# Patient Record
Sex: FEMALE | Race: WHITE | HISPANIC OR LATINO | Employment: UNEMPLOYED | URBAN - METROPOLITAN AREA
[De-identification: names, ages, dates, MRNs, and addresses within clinical notes are randomized per-mention and may not be internally consistent; named-entity substitution may affect disease eponyms.]

---

## 2022-12-18 ENCOUNTER — HOSPITAL ENCOUNTER (EMERGENCY)
Facility: HOSPITAL | Age: 57
Discharge: HOME | End: 2022-12-18
Attending: EMERGENCY MEDICINE

## 2022-12-18 VITALS
BODY MASS INDEX: 29.79 KG/M2 | SYSTOLIC BLOOD PRESSURE: 134 MMHG | HEART RATE: 70 BPM | TEMPERATURE: 98 F | HEIGHT: 65 IN | OXYGEN SATURATION: 100 % | RESPIRATION RATE: 17 BRPM | DIASTOLIC BLOOD PRESSURE: 86 MMHG

## 2022-12-18 DIAGNOSIS — R07.89 CHEST DISCOMFORT: ICD-10-CM

## 2022-12-18 DIAGNOSIS — M54.2 NECK PAIN: ICD-10-CM

## 2022-12-18 DIAGNOSIS — M19.011 OSTEOARTHRITIS OF RIGHT SHOULDER, UNSPECIFIED OSTEOARTHRITIS TYPE: Primary | ICD-10-CM

## 2022-12-18 DIAGNOSIS — M47.812 OSTEOARTHRITIS OF CERVICAL SPINE, UNSPECIFIED SPINAL OSTEOARTHRITIS COMPLICATION STATUS: ICD-10-CM

## 2022-12-18 DIAGNOSIS — R07.9 CHEST PAIN: ICD-10-CM

## 2022-12-18 DIAGNOSIS — M25.511 RIGHT SHOULDER PAIN: ICD-10-CM

## 2022-12-18 LAB
ALBUMIN SERPL BCP-MCNC: 3.9 G/DL (ref 3.5–5.2)
ALP SERPL-CCNC: 118 U/L (ref 55–135)
ALT SERPL W/O P-5'-P-CCNC: 26 U/L (ref 10–44)
ANION GAP SERPL CALC-SCNC: 7 MMOL/L (ref 8–16)
AST SERPL-CCNC: 20 U/L (ref 10–40)
BASOPHILS # BLD AUTO: 0.03 K/UL (ref 0–0.2)
BASOPHILS NFR BLD: 0.7 % (ref 0–1.9)
BILIRUB SERPL-MCNC: 0.4 MG/DL (ref 0.1–1)
BNP SERPL-MCNC: 40 PG/ML (ref 0–99)
BUN SERPL-MCNC: 14 MG/DL (ref 6–20)
CALCIUM SERPL-MCNC: 9.3 MG/DL (ref 8.7–10.5)
CHLORIDE SERPL-SCNC: 109 MMOL/L (ref 95–110)
CO2 SERPL-SCNC: 25 MMOL/L (ref 23–29)
CREAT SERPL-MCNC: 0.7 MG/DL (ref 0.5–1.4)
D DIMER PPP IA.FEU-MCNC: 0.28 MG/L FEU
DIFFERENTIAL METHOD: ABNORMAL
EOSINOPHIL # BLD AUTO: 0.2 K/UL (ref 0–0.5)
EOSINOPHIL NFR BLD: 3.7 % (ref 0–8)
ERYTHROCYTE [DISTWIDTH] IN BLOOD BY AUTOMATED COUNT: 12.4 % (ref 11.5–14.5)
EST. GFR  (NO RACE VARIABLE): >60 ML/MIN/1.73 M^2
GLUCOSE SERPL-MCNC: 101 MG/DL (ref 70–110)
HCT VFR BLD AUTO: 38.3 % (ref 37–48.5)
HGB BLD-MCNC: 13 G/DL (ref 12–16)
IMM GRANULOCYTES # BLD AUTO: 0 K/UL (ref 0–0.04)
IMM GRANULOCYTES NFR BLD AUTO: 0 % (ref 0–0.5)
LYMPHOCYTES # BLD AUTO: 1.7 K/UL (ref 1–4.8)
LYMPHOCYTES NFR BLD: 38.7 % (ref 18–48)
MCH RBC QN AUTO: 28.5 PG (ref 27–31)
MCHC RBC AUTO-ENTMCNC: 33.9 G/DL (ref 32–36)
MCV RBC AUTO: 84 FL (ref 82–98)
MONOCYTES # BLD AUTO: 0.4 K/UL (ref 0.3–1)
MONOCYTES NFR BLD: 8.2 % (ref 4–15)
NEUTROPHILS # BLD AUTO: 2.1 K/UL (ref 1.8–7.7)
NEUTROPHILS NFR BLD: 48.7 % (ref 38–73)
NRBC BLD-RTO: 0 /100 WBC
PLATELET # BLD AUTO: 299 K/UL (ref 150–450)
PMV BLD AUTO: 8.6 FL (ref 9.2–12.9)
POTASSIUM SERPL-SCNC: 3.9 MMOL/L (ref 3.5–5.1)
PROT SERPL-MCNC: 7.4 G/DL (ref 6–8.4)
RBC # BLD AUTO: 4.56 M/UL (ref 4–5.4)
SODIUM SERPL-SCNC: 141 MMOL/L (ref 136–145)
TROPONIN I SERPL DL<=0.01 NG/ML-MCNC: <0.006 NG/ML (ref 0–0.03)
WBC # BLD AUTO: 4.37 K/UL (ref 3.9–12.7)

## 2022-12-18 PROCEDURE — 85025 COMPLETE CBC W/AUTO DIFF WBC: CPT | Performed by: EMERGENCY MEDICINE

## 2022-12-18 PROCEDURE — 85379 FIBRIN DEGRADATION QUANT: CPT | Performed by: EMERGENCY MEDICINE

## 2022-12-18 PROCEDURE — 63600175 PHARM REV CODE 636 W HCPCS: Performed by: EMERGENCY MEDICINE

## 2022-12-18 PROCEDURE — 83880 ASSAY OF NATRIURETIC PEPTIDE: CPT | Performed by: EMERGENCY MEDICINE

## 2022-12-18 PROCEDURE — 84484 ASSAY OF TROPONIN QUANT: CPT | Performed by: EMERGENCY MEDICINE

## 2022-12-18 PROCEDURE — 93010 EKG 12-LEAD: ICD-10-PCS | Mod: ,,, | Performed by: INTERNAL MEDICINE

## 2022-12-18 PROCEDURE — 96374 THER/PROPH/DIAG INJ IV PUSH: CPT

## 2022-12-18 PROCEDURE — 99285 EMERGENCY DEPT VISIT HI MDM: CPT | Mod: 25

## 2022-12-18 PROCEDURE — 93010 ELECTROCARDIOGRAM REPORT: CPT | Mod: ,,, | Performed by: INTERNAL MEDICINE

## 2022-12-18 PROCEDURE — 80053 COMPREHEN METABOLIC PANEL: CPT | Performed by: EMERGENCY MEDICINE

## 2022-12-18 PROCEDURE — 93005 ELECTROCARDIOGRAM TRACING: CPT

## 2022-12-18 PROCEDURE — 25000003 PHARM REV CODE 250: Performed by: EMERGENCY MEDICINE

## 2022-12-18 RX ORDER — CYCLOBENZAPRINE HCL 10 MG
10 TABLET ORAL
Status: COMPLETED | OUTPATIENT
Start: 2022-12-18 | End: 2022-12-18

## 2022-12-18 RX ORDER — FAMOTIDINE 20 MG/1
20 TABLET, FILM COATED ORAL
Status: COMPLETED | OUTPATIENT
Start: 2022-12-18 | End: 2022-12-18

## 2022-12-18 RX ORDER — FAMOTIDINE 20 MG/1
20 TABLET, FILM COATED ORAL 2 TIMES DAILY
Qty: 30 TABLET | Refills: 0 | Status: SHIPPED | OUTPATIENT
Start: 2022-12-18 | End: 2023-04-06

## 2022-12-18 RX ORDER — ASPIRIN 325 MG
325 TABLET ORAL
Status: COMPLETED | OUTPATIENT
Start: 2022-12-18 | End: 2022-12-18

## 2022-12-18 RX ORDER — KETOROLAC TROMETHAMINE 30 MG/ML
15 INJECTION, SOLUTION INTRAMUSCULAR; INTRAVENOUS
Status: COMPLETED | OUTPATIENT
Start: 2022-12-18 | End: 2022-12-18

## 2022-12-18 RX ORDER — CYCLOBENZAPRINE HCL 10 MG
10 TABLET ORAL 3 TIMES DAILY PRN
Qty: 15 TABLET | Refills: 0 | Status: SHIPPED | OUTPATIENT
Start: 2022-12-18 | End: 2023-04-06 | Stop reason: ALTCHOICE

## 2022-12-18 RX ORDER — NAPROXEN 500 MG/1
500 TABLET ORAL 2 TIMES DAILY PRN
Qty: 30 TABLET | Refills: 0 | Status: SHIPPED | OUTPATIENT
Start: 2022-12-18 | End: 2023-04-06 | Stop reason: ALTCHOICE

## 2022-12-18 RX ADMIN — ASPIRIN 325 MG ORAL TABLET 325 MG: 325 PILL ORAL at 09:12

## 2022-12-18 RX ADMIN — KETOROLAC TROMETHAMINE 15 MG: 30 INJECTION, SOLUTION INTRAMUSCULAR; INTRAVENOUS at 09:12

## 2022-12-18 RX ADMIN — FAMOTIDINE 20 MG: 20 TABLET ORAL at 09:12

## 2022-12-18 RX ADMIN — CYCLOBENZAPRINE 10 MG: 10 TABLET, FILM COATED ORAL at 09:12

## 2022-12-18 NOTE — ED PROVIDER NOTES
"Encounter Date: 12/18/2022    SCRIBE #1 NOTE: I, MAXIME MALDONADO, am scribing for, and in the presence of,  Charley Niño MD. I have scribed the following portions of the note - Other sections scribed: HPI, ROS, PE, EKG.     History     Chief Complaint   Patient presents with    Shoulder Pain     Pt c/o right shoulder/arm pain x 3 days accompanied by chest discomfort x 1 week which is reproducible when pt presses on affected area . Pt denies fall or trauma to extremity. Pt states she took ibuprofen for her pain last night with no relief. Pt denies cp, sob, n/v/d.     This is a 56 y/o female with a PMHx of HTN who presents to the ED complaining of right shoulder pain with associated left-sided CP, palpitations, and SOB for one week. The patient states that the right shoulder pain has been constant and worsens with movement. She further states that the CP worsens at night and feels like a "pressure". Denies any recent traumas or injuries. Endorses taking Ibuprofen in attempt to alleviate pain with minimal relief. Further endorses compliance with antihypertensives. No other exacerbating or alleviating factors. Denies any fever, nausea, emesis, diaphoresis, leg swelling, or other associated symptoms.     The history is provided by the patient. The history is limited by a language barrier. No  was used.   Review of patient's allergies indicates:  No Known Allergies  Past Medical History:   Diagnosis Date    Hypertension      History reviewed. No pertinent surgical history.  History reviewed. No pertinent family history.  Social History     Tobacco Use    Smoking status: Never   Substance Use Topics    Alcohol use: No     Review of Systems   Constitutional:  Negative for diaphoresis and fever.   HENT:  Negative for facial swelling and voice change.    Eyes:  Negative for pain.   Respiratory:  Positive for shortness of breath. Negative for choking.    Cardiovascular:  Positive for chest pain and " palpitations. Negative for leg swelling.   Gastrointestinal:  Negative for abdominal pain, nausea and vomiting.   Genitourinary:  Negative for dysuria and frequency.   Musculoskeletal:  Positive for arthralgias. Negative for back pain.   Skin:  Negative for rash.   Neurological:  Negative for weakness and numbness.   Psychiatric/Behavioral:  Negative for self-injury.      Physical Exam     Initial Vitals [12/18/22 0903]   BP Pulse Resp Temp SpO2   136/63 87 16 98.1 °F (36.7 °C) 98 %      MAP       --         Physical Exam    Nursing note and vitals reviewed.  Constitutional: She is not diaphoretic. No distress.   HENT:   Head: Normocephalic and atraumatic.   Mouth/Throat: Oropharynx is clear and moist.   Eyes: Conjunctivae and EOM are normal. No scleral icterus.   Neck: Neck supple. No JVD present.   Normal range of motion.  Cardiovascular:  Normal rate, regular rhythm and intact distal pulses.           Pulmonary/Chest: Breath sounds normal. No stridor. No respiratory distress.   Abdominal: Abdomen is soft. Bowel sounds are normal. She exhibits no distension. There is no abdominal tenderness.   Musculoskeletal:         General: No edema. Normal range of motion.      Right shoulder: Tenderness present.      Cervical back: Normal range of motion and neck supple.      Comments: Right shoulder pain with attempted ROM. 5/5 strength.      Neurological: She is alert. She has normal strength. No cranial nerve deficit or sensory deficit. GCS score is 15. GCS eye subscore is 4. GCS verbal subscore is 5. GCS motor subscore is 6.   Skin: Skin is warm and dry.   Psychiatric: She has a normal mood and affect.       ED Course   Procedures  Labs Reviewed   CBC W/ AUTO DIFFERENTIAL - Abnormal; Notable for the following components:       Result Value    MPV 8.6 (*)     All other components within normal limits   COMPREHENSIVE METABOLIC PANEL - Abnormal; Notable for the following components:    Anion Gap 7 (*)     All other  components within normal limits   TROPONIN I   B-TYPE NATRIURETIC PEPTIDE   D DIMER, QUANTITATIVE     EKG Readings: (Independently Interpreted)   Rhythm: Normal Sinus Rhythm. Heart Rate: 78 bpm. ST Segments: Non-Specific ST Segment Depression. T Waves Flipped: V4, II, III, AVF, V5 and V6. Axis: Normal.     Imaging Results              X-Ray Chest AP Portable (Final result)  Result time 12/18/22 10:38:55      Final result by Bony Arcos MD (12/18/22 10:38:55)                   Impression:      No acute chest disease identified.  No detrimental change noted when compared to 04/16/2016.      Electronically signed by: Bony Arcos MD  Date:    12/18/2022  Time:    10:38               Narrative:    EXAMINATION:  XR CHEST AP PORTABLE    CLINICAL HISTORY:  Chest Pain;    TECHNIQUE:  Single frontal view of the chest was performed.    COMPARISON:  04/16/2016.    FINDINGS:  The heart and mediastinal structures are unremarkable.  Pulmonary vasculature is within normal limits.  The lungs are well aerated and free of focal consolidations.  There is no evidence for pneumothorax or large pleural effusions.  Bony structures are grossly intact.                                       X-Ray Cervical Spine AP And Lateral (Final result)  Result time 12/18/22 10:40:07      Final result by Bony Arcos MD (12/18/22 10:40:07)                   Impression:      No evidence for acute fracture, bone destruction, or subluxation.    Cervical spondylosis.      Electronically signed by: Bony Arcos MD  Date:    12/18/2022  Time:    10:40               Narrative:    EXAMINATION:  XR CERVICAL SPINE AP LATERAL    CLINICAL HISTORY:  Cervicalgia    TECHNIQUE:  AP, lateral and open mouth views of the cervical spine were performed.    COMPARISON:  None.    FINDINGS:  Posterior vertebral alignment is satisfactory.  Vertebral body heights are well maintained.  The odontoid appears intact there is no evidence for acute fracture or bone  destruction.  There are degenerative changes with small anterior osteophytes noted most prominently within the mid to lower cervical spine.  Prevertebral soft tissues are unremarkable.                                       X-Ray Shoulder Complete 2 View Right (Final result)  Result time 12/18/22 10:41:01      Final result by Bony Arcos MD (12/18/22 10:41:01)                   Impression:      No evidence for acute fracture, bone destruction, or dislocation.    Degenerative changes of the right glenohumeral and acromioclavicular joints.      Electronically signed by: Bony Arcos MD  Date:    12/18/2022  Time:    10:41               Narrative:    EXAMINATION:  XR SHOULDER COMPLETE 2 OR MORE VIEWS RIGHT    CLINICAL HISTORY:  Pain in right shoulder    TECHNIQUE:  Two or three views of the right shoulder were performed.    COMPARISON:  None    FINDINGS:  The bones are intact.  There is no evidence for acute fracture or bone destruction.  There is no evidence for dislocation.  There are degenerative changes involving the glenohumeral and acromioclavicular joints with small osteophytes present.  No bony erosions are identified.  Soft tissues are unremarkable.                                       Medications   aspirin tablet 325 mg (325 mg Oral Given 12/18/22 0951)   ketorolac injection 15 mg (15 mg Intravenous Given 12/18/22 0951)   cyclobenzaprine tablet 10 mg (10 mg Oral Given 12/18/22 0951)   famotidine tablet 20 mg (20 mg Oral Given 12/18/22 0951)     Medical Decision Making:   History:   Old Medical Records: I decided to obtain old medical records.  Independently Interpreted Test(s):   I have ordered and independently interpreted EKG Reading(s) - see prior notes  Clinical Tests:   Lab Tests: Ordered and Reviewed  Radiological Study: Ordered and Reviewed  Medical Tests: Ordered and Reviewed  ED Management:  Patient presenting secondary to chest pain. EKG was reassuring and chest xray showed nothing acute.   Shoulder x-ray and neck x-ray with findings consistent with degenerative changes.  Patient given anti-inflammatory and muscle relaxant in the emergency department.  On reassessment she reports improvement in symptoms.  She is able to range the shoulder better.  Symptoms appear most consistent with musculoskeletal pain.  Patient discharge on anti-inflammatory and muscle relaxant.  Referred to primary physician, Cardiology, PM&R      [email or web address]    Also considered but less likely:     PE: normal rate, dimer negative.   Pneumonia: chest xray negative. No fever. No cough and lungs non consistent with pna  Tamponade: unlikely due to chest xray and ekg  STEMI: No STEMI on ekg  Dissection: equal pulses bilaterally and no ripping chest pain to the back  Esophageal rupture: no dysphagia or vomiting and chest xray negative for mediastinal air  Arrhythmia: no arrhythmia on ekg  CHF: no fluid overload on Cxr and physical exam  Pneumothorax: bilateral breath sounds and no signs of pneumothorax on chest xray     Patient has a lower risk of impending cardiac failure to the heart score of 3. Patient was discharged with follow up with PMD. Return precautions given, patient understands and agrees with plan. All questions answered.      This chart was completed using dictation software, as a result there may be some transcription errors.    Additional MDM:   Heart Score:    History:          Slightly suspicious.  ECG:             Nonspecific repolarisation disturbance  Age:               45-65 years  Risk factors: 1-2 risk factors  Troponin:       Less than or equal to normal limit  Final Score: 3       Scribe Attestation:   Scribe #1: I performed the above scribed service and the documentation accurately describes the services I performed. I attest to the accuracy of the note.                   Clinical Impression:   Final diagnoses:  [R07.9] Chest pain  [M54.2] Neck pain  [M25.511] Right  shoulder pain  [M19.011] Osteoarthritis of right shoulder, unspecified osteoarthritis type (Primary)  [M47.812] Osteoarthritis of cervical spine, unspecified spinal osteoarthritis complication status     Scribe attestation: I, Charley Niño , personally performed the services described in this documentation. All medical record entries made by the scribe were at my direction and in my presence.  I have reviewed the chart and agree that the record reflects my personal performance and is accurate and complete.    ED Disposition Condition    Discharge Stable          ED Prescriptions       Medication Sig Dispense Start Date End Date Auth. Provider    famotidine (PEPCID) 20 MG tablet Take 1 tablet (20 mg total) by mouth 2 (two) times daily. 30 tablet 12/18/2022 -- Charley Niño MD    naproxen (NAPROSYN) 500 MG tablet Take 1 tablet (500 mg total) by mouth 2 (two) times daily as needed (Pain). Take with food to prevent upset stomach 30 tablet 12/18/2022 -- Charley Niño MD    cyclobenzaprine (FLEXERIL) 10 MG tablet Take 1 tablet (10 mg total) by mouth 3 (three) times daily as needed for Muscle spasms (Or tightness). Do not drive or operate heavy machinery on this medication as it can cause you to become drowsy and decrease coordination. 15 tablet 12/18/2022 -- Charley Niño MD          Follow-up Information       Follow up With Specialties Details Why Contact Info Additional Information     [facility]  Schedule an appointment as soon as possible for a visit   [address] [place] [address]  [phone]       [facility] Cardiology Schedule an appointment as soon as possible for a visit   [address]  [facility] [id number] [phone] Please park in garage or Medical Office Bldg. surface lot and use Medical Ofc Bldg elevator. Check in at [address].             Charley Niño MD  12/18/22 9467

## 2022-12-18 NOTE — DISCHARGE INSTRUCTIONS
Thank you for coming to our Emergency Department today. It is important to remember that some problems are difficult to diagnose and may not be found during your first visit. Be sure to follow up with your primary care doctor and review any labs/imaging that was performed with them. If you do not have a primary care doctor, you may contact the one listed on your discharge paperwork or you may also call the [facility] Appointment Desk at [phone] to schedule an appointment with one.     All medications may potentially have side effects and it is impossible to predict which medications may give you side effects. If you feel that you are having a negative effect of any medication you should immediately stop taking them and seek medical attention.    Return to the ER with any questions/concerns, new/concerning symptoms, worsening or failure to improve. Do not drive or make any important decisions for 24 hours if you have received any pain medications, sedatives or mood altering drugs during your ER visit.

## 2023-04-05 ENCOUNTER — HOSPITAL ENCOUNTER (OUTPATIENT)
Facility: HOSPITAL | Age: 58
Discharge: HOME | End: 2023-04-06
Attending: EMERGENCY MEDICINE | Admitting: INTERNAL MEDICINE

## 2023-04-05 DIAGNOSIS — R07.9 CHEST PAIN: Primary | ICD-10-CM

## 2023-04-05 LAB
ALBUMIN SERPL BCP-MCNC: 3.9 G/DL (ref 3.5–5.2)
ALP SERPL-CCNC: 117 U/L (ref 55–135)
ALT SERPL W/O P-5'-P-CCNC: 22 U/L (ref 10–44)
ANION GAP SERPL CALC-SCNC: 11 MMOL/L (ref 8–16)
AST SERPL-CCNC: 20 U/L (ref 10–40)
BASOPHILS # BLD AUTO: 0.05 K/UL (ref 0–0.2)
BASOPHILS NFR BLD: 1.1 % (ref 0–1.9)
BILIRUB SERPL-MCNC: 0.2 MG/DL (ref 0.1–1)
BNP SERPL-MCNC: 21 PG/ML (ref 0–99)
BUN SERPL-MCNC: 18 MG/DL (ref 6–20)
CALCIUM SERPL-MCNC: 9.2 MG/DL (ref 8.7–10.5)
CHLORIDE SERPL-SCNC: 106 MMOL/L (ref 95–110)
CO2 SERPL-SCNC: 24 MMOL/L (ref 23–29)
CREAT SERPL-MCNC: 0.8 MG/DL (ref 0.5–1.4)
DIFFERENTIAL METHOD: ABNORMAL
EOSINOPHIL # BLD AUTO: 0.2 K/UL (ref 0–0.5)
EOSINOPHIL NFR BLD: 4.7 % (ref 0–8)
ERYTHROCYTE [DISTWIDTH] IN BLOOD BY AUTOMATED COUNT: 12.3 % (ref 11.5–14.5)
EST. GFR  (NO RACE VARIABLE): >60 ML/MIN/1.73 M^2
GLUCOSE SERPL-MCNC: 115 MG/DL (ref 70–110)
HCT VFR BLD AUTO: 37.2 % (ref 37–48.5)
HGB BLD-MCNC: 13.1 G/DL (ref 12–16)
IMM GRANULOCYTES # BLD AUTO: 0.01 K/UL (ref 0–0.04)
IMM GRANULOCYTES NFR BLD AUTO: 0.2 % (ref 0–0.5)
LYMPHOCYTES # BLD AUTO: 2 K/UL (ref 1–4.8)
LYMPHOCYTES NFR BLD: 43.1 % (ref 18–48)
MCH RBC QN AUTO: 29.9 PG (ref 27–31)
MCHC RBC AUTO-ENTMCNC: 35.2 G/DL (ref 32–36)
MCV RBC AUTO: 85 FL (ref 82–98)
MONOCYTES # BLD AUTO: 0.3 K/UL (ref 0.3–1)
MONOCYTES NFR BLD: 6.4 % (ref 4–15)
NEUTROPHILS # BLD AUTO: 2.1 K/UL (ref 1.8–7.7)
NEUTROPHILS NFR BLD: 44.5 % (ref 38–73)
NRBC BLD-RTO: 0 /100 WBC
PLATELET # BLD AUTO: 321 K/UL (ref 150–450)
PMV BLD AUTO: 8.9 FL (ref 9.2–12.9)
POTASSIUM SERPL-SCNC: 3.7 MMOL/L (ref 3.5–5.1)
PROT SERPL-MCNC: 7.4 G/DL (ref 6–8.4)
RBC # BLD AUTO: 4.38 M/UL (ref 4–5.4)
SODIUM SERPL-SCNC: 141 MMOL/L (ref 136–145)
TROPONIN I SERPL DL<=0.01 NG/ML-MCNC: 0.01 NG/ML (ref 0–0.03)
TROPONIN I SERPL DL<=0.01 NG/ML-MCNC: <0.006 NG/ML (ref 0–0.03)
WBC # BLD AUTO: 4.66 K/UL (ref 3.9–12.7)

## 2023-04-05 PROCEDURE — 93005 ELECTROCARDIOGRAM TRACING: CPT

## 2023-04-05 PROCEDURE — 93010 ELECTROCARDIOGRAM REPORT: CPT | Mod: ,,, | Performed by: INTERNAL MEDICINE

## 2023-04-05 PROCEDURE — 25000003 PHARM REV CODE 250: Performed by: EMERGENCY MEDICINE

## 2023-04-05 PROCEDURE — 99285 EMERGENCY DEPT VISIT HI MDM: CPT | Mod: 25

## 2023-04-05 PROCEDURE — 84484 ASSAY OF TROPONIN QUANT: CPT | Mod: 91 | Performed by: EMERGENCY MEDICINE

## 2023-04-05 PROCEDURE — 93010 EKG 12-LEAD: ICD-10-PCS | Mod: ,,, | Performed by: INTERNAL MEDICINE

## 2023-04-05 PROCEDURE — 87502 INFLUENZA DNA AMP PROBE: CPT

## 2023-04-05 PROCEDURE — 80053 COMPREHEN METABOLIC PANEL: CPT | Performed by: EMERGENCY MEDICINE

## 2023-04-05 PROCEDURE — 83880 ASSAY OF NATRIURETIC PEPTIDE: CPT | Performed by: EMERGENCY MEDICINE

## 2023-04-05 PROCEDURE — 85025 COMPLETE CBC W/AUTO DIFF WBC: CPT | Performed by: EMERGENCY MEDICINE

## 2023-04-05 RX ORDER — SODIUM CHLORIDE 0.9 % (FLUSH) 0.9 %
10 SYRINGE (ML) INJECTION EVERY 12 HOURS PRN
Status: DISCONTINUED | OUTPATIENT
Start: 2023-04-06 | End: 2023-04-06 | Stop reason: HOSPADM

## 2023-04-05 RX ORDER — ENOXAPARIN SODIUM 100 MG/ML
40 INJECTION SUBCUTANEOUS EVERY 24 HOURS
Status: DISCONTINUED | OUTPATIENT
Start: 2023-04-06 | End: 2023-04-06 | Stop reason: HOSPADM

## 2023-04-05 RX ORDER — GLUCAGON 1 MG
1 KIT INJECTION
Status: DISCONTINUED | OUTPATIENT
Start: 2023-04-06 | End: 2023-04-06 | Stop reason: HOSPADM

## 2023-04-05 RX ORDER — LIDOCAINE HYDROCHLORIDE 20 MG/ML
15 SOLUTION OROPHARYNGEAL ONCE
Status: COMPLETED | OUTPATIENT
Start: 2023-04-05 | End: 2023-04-05

## 2023-04-05 RX ORDER — DEXTROSE 40 %
15 GEL (GRAM) ORAL
Status: DISCONTINUED | OUTPATIENT
Start: 2023-04-05 | End: 2023-04-06 | Stop reason: HOSPADM

## 2023-04-05 RX ORDER — TALC
9 POWDER (GRAM) TOPICAL NIGHTLY
Status: DISCONTINUED | OUTPATIENT
Start: 2023-04-06 | End: 2023-04-06 | Stop reason: HOSPADM

## 2023-04-05 RX ORDER — MAG HYDROX/ALUMINUM HYD/SIMETH 200-200-20
30 SUSPENSION, ORAL (FINAL DOSE FORM) ORAL
Status: DISCONTINUED | OUTPATIENT
Start: 2023-04-06 | End: 2023-04-06 | Stop reason: HOSPADM

## 2023-04-05 RX ORDER — MAG HYDROX/ALUMINUM HYD/SIMETH 200-200-20
30 SUSPENSION, ORAL (FINAL DOSE FORM) ORAL ONCE
Status: COMPLETED | OUTPATIENT
Start: 2023-04-05 | End: 2023-04-05

## 2023-04-05 RX ORDER — NALOXONE HCL 0.4 MG/ML
0.02 VIAL (ML) INJECTION
Status: DISCONTINUED | OUTPATIENT
Start: 2023-04-05 | End: 2023-04-06 | Stop reason: HOSPADM

## 2023-04-05 RX ORDER — SUCRALFATE 1 G/10ML
1 SUSPENSION ORAL EVERY 6 HOURS
Status: DISCONTINUED | OUTPATIENT
Start: 2023-04-06 | End: 2023-04-06 | Stop reason: HOSPADM

## 2023-04-05 RX ORDER — DEXTROSE 40 %
30 GEL (GRAM) ORAL
Status: DISCONTINUED | OUTPATIENT
Start: 2023-04-05 | End: 2023-04-06 | Stop reason: HOSPADM

## 2023-04-05 RX ADMIN — ALUMINUM HYDROXIDE, MAGNESIUM HYDROXIDE, AND SIMETHICONE 30 ML: 200; 200; 20 SUSPENSION ORAL at 09:04

## 2023-04-05 RX ADMIN — LIDOCAINE HYDROCHLORIDE 15 ML: 20 SOLUTION ORAL; TOPICAL at 09:04

## 2023-04-06 VITALS
HEART RATE: 71 BPM | HEIGHT: 65 IN | DIASTOLIC BLOOD PRESSURE: 72 MMHG | SYSTOLIC BLOOD PRESSURE: 105 MMHG | BODY MASS INDEX: 29.16 KG/M2 | RESPIRATION RATE: 18 BRPM | TEMPERATURE: 98 F | WEIGHT: 175 LBS | OXYGEN SATURATION: 97 %

## 2023-04-06 LAB
ALBUMIN SERPL BCP-MCNC: 3.9 G/DL (ref 3.5–5.2)
ALP SERPL-CCNC: 115 U/L (ref 55–135)
ALT SERPL W/O P-5'-P-CCNC: 19 U/L (ref 10–44)
ANION GAP SERPL CALC-SCNC: 9 MMOL/L (ref 8–16)
AORTIC ROOT ANNULUS: 2.94 CM
AORTIC VALVE CUSP SEPERATION: 1.56 CM
AST SERPL-CCNC: 19 U/L (ref 10–40)
AV INDEX (PROSTH): 0.77
AV MEAN GRADIENT: 4 MMHG
AV PEAK GRADIENT: 7 MMHG
AV REGURGITATION PRESSURE HALF TIME: 779.05 MS
AV VALVE AREA: 2.5 CM2
AV VELOCITY RATIO: 0.68
BASOPHILS # BLD AUTO: 0.05 K/UL (ref 0–0.2)
BASOPHILS NFR BLD: 1 % (ref 0–1.9)
BILIRUB SERPL-MCNC: 0.3 MG/DL (ref 0.1–1)
BSA FOR ECHO PROCEDURE: 1.91 M2
BUN SERPL-MCNC: 15 MG/DL (ref 6–20)
CALCIUM SERPL-MCNC: 8.9 MG/DL (ref 8.7–10.5)
CHLORIDE SERPL-SCNC: 103 MMOL/L (ref 95–110)
CHOLEST SERPL-MCNC: 203 MG/DL (ref 120–199)
CHOLEST/HDLC SERPL: 3.9 {RATIO} (ref 2–5)
CO2 SERPL-SCNC: 30 MMOL/L (ref 23–29)
CREAT SERPL-MCNC: 0.7 MG/DL (ref 0.5–1.4)
CTP QC/QA: YES
CTP QC/QA: YES
CV ECHO LV RWT: 0.39 CM
DIFFERENTIAL METHOD: ABNORMAL
DOP CALC AO PEAK VEL: 1.29 M/S
DOP CALC AO VTI: 23.3 CM
DOP CALC LVOT AREA: 3.2 CM2
DOP CALC LVOT DIAMETER: 2.03 CM
DOP CALC LVOT PEAK VEL: 0.88 M/S
DOP CALC LVOT STROKE VOLUME: 58.23 CM3
DOP CALC MV VTI: 29.4 CM
DOP CALCLVOT PEAK VEL VTI: 18 CM
E WAVE DECELERATION TIME: 226.45 MSEC
E/A RATIO: 0.73
E/E' RATIO: 8.35 M/S
ECHO LV POSTERIOR WALL: 0.92 CM (ref 0.6–1.1)
EJECTION FRACTION: 55 %
EOSINOPHIL # BLD AUTO: 0.2 K/UL (ref 0–0.5)
EOSINOPHIL NFR BLD: 4.3 % (ref 0–8)
ERYTHROCYTE [DISTWIDTH] IN BLOOD BY AUTOMATED COUNT: 12.6 % (ref 11.5–14.5)
EST. GFR  (NO RACE VARIABLE): >60 ML/MIN/1.73 M^2
ESTIMATED AVG GLUCOSE: 100 MG/DL (ref 68–131)
FRACTIONAL SHORTENING: 45 % (ref 28–44)
GLUCOSE SERPL-MCNC: 101 MG/DL (ref 70–110)
HAV IGM SERPL QL IA: NORMAL
HBA1C MFR BLD: 5.1 % (ref 4–5.6)
HBV CORE IGM SERPL QL IA: NORMAL
HBV SURFACE AG SERPL QL IA: NORMAL
HCT VFR BLD AUTO: 39.1 % (ref 37–48.5)
HCV AB SERPL QL IA: NORMAL
HDLC SERPL-MCNC: 52 MG/DL (ref 40–75)
HDLC SERPL: 25.6 % (ref 20–50)
HGB BLD-MCNC: 13.5 G/DL (ref 12–16)
HIV 1+2 AB+HIV1 P24 AG SERPL QL IA: NORMAL
IMM GRANULOCYTES # BLD AUTO: 0.01 K/UL (ref 0–0.04)
IMM GRANULOCYTES NFR BLD AUTO: 0.2 % (ref 0–0.5)
INTERVENTRICULAR SEPTUM: 0.75 CM (ref 0.6–1.1)
IVRT: 102.76 MSEC
LA MAJOR: 5.38 CM
LA MINOR: 4.92 CM
LA WIDTH: 3.3 CM
LDLC SERPL CALC-MCNC: 134.6 MG/DL (ref 63–159)
LEFT ATRIUM SIZE: 3.73 CM
LEFT ATRIUM VOLUME INDEX MOD: 16.6 ML/M2
LEFT ATRIUM VOLUME INDEX: 28.8 ML/M2
LEFT ATRIUM VOLUME MOD: 31.12 CM3
LEFT ATRIUM VOLUME: 53.78 CM3
LEFT INTERNAL DIMENSION IN SYSTOLE: 2.6 CM (ref 2.1–4)
LEFT VENTRICLE DIASTOLIC VOLUME INDEX: 50.84 ML/M2
LEFT VENTRICLE DIASTOLIC VOLUME: 95.08 ML
LEFT VENTRICLE MASS INDEX: 69 G/M2
LEFT VENTRICLE SYSTOLIC VOLUME INDEX: 27.9 ML/M2
LEFT VENTRICLE SYSTOLIC VOLUME: 52.12 ML
LEFT VENTRICULAR INTERNAL DIMENSION IN DIASTOLE: 4.7 CM (ref 3.5–6)
LEFT VENTRICULAR MASS: 129.27 G
LV LATERAL E/E' RATIO: 7.1 M/S
LV SEPTAL E/E' RATIO: 10.14 M/S
LVOT MG: 1.55 MMHG
LVOT MV: 0.56 CM/S
LYMPHOCYTES # BLD AUTO: 2.3 K/UL (ref 1–4.8)
LYMPHOCYTES NFR BLD: 45.8 % (ref 18–48)
MAGNESIUM SERPL-MCNC: 2.3 MG/DL (ref 1.6–2.6)
MCH RBC QN AUTO: 29.3 PG (ref 27–31)
MCHC RBC AUTO-ENTMCNC: 34.5 G/DL (ref 32–36)
MCV RBC AUTO: 85 FL (ref 82–98)
MONOCYTES # BLD AUTO: 0.4 K/UL (ref 0.3–1)
MONOCYTES NFR BLD: 7.1 % (ref 4–15)
MV MEAN GRADIENT: 1 MMHG
MV PEAK A VEL: 0.97 M/S
MV PEAK E VEL: 0.71 M/S
MV PEAK GRADIENT: 4 MMHG
MV STENOSIS PRESSURE HALF TIME: 65.67 MS
MV VALVE AREA BY CONTINUITY EQUATION: 1.98 CM2
MV VALVE AREA P 1/2 METHOD: 3.35 CM2
NEUTROPHILS # BLD AUTO: 2.1 K/UL (ref 1.8–7.7)
NEUTROPHILS NFR BLD: 41.6 % (ref 38–73)
NONHDLC SERPL-MCNC: 151 MG/DL
NRBC BLD-RTO: 0 /100 WBC
PHOSPHATE SERPL-MCNC: 3.8 MG/DL (ref 2.7–4.5)
PISA AR MAX VEL: 4.03 M/S
PISA TR MAX VEL: 2.16 M/S
PLATELET # BLD AUTO: 324 K/UL (ref 150–450)
PMV BLD AUTO: 8.8 FL (ref 9.2–12.9)
POC MOLECULAR INFLUENZA A AGN: NEGATIVE
POC MOLECULAR INFLUENZA B AGN: NEGATIVE
POTASSIUM SERPL-SCNC: 3.7 MMOL/L (ref 3.5–5.1)
PROT SERPL-MCNC: 7.5 G/DL (ref 6–8.4)
PULM VEIN S/D RATIO: 1.79
PV PEAK D VEL: 0.28 M/S
PV PEAK S VEL: 0.5 M/S
RA MAJOR: 4.77 CM
RA PRESSURE: 3 MMHG
RA WIDTH: 3.5 CM
RBC # BLD AUTO: 4.61 M/UL (ref 4–5.4)
RIGHT VENTRICULAR END-DIASTOLIC DIMENSION: 2.55 CM
RIGHT VENTRICULAR LENGTH IN DIASTOLE (APICAL 4-CHAMBER VIEW): 1.58 CM
SARS-COV-2 RDRP RESP QL NAA+PROBE: NEGATIVE
SODIUM SERPL-SCNC: 142 MMOL/L (ref 136–145)
TDI LATERAL: 0.1 M/S
TDI SEPTAL: 0.07 M/S
TDI: 0.09 M/S
TR MAX PG: 19 MMHG
TRIGL SERPL-MCNC: 82 MG/DL (ref 30–150)
TSH SERPL DL<=0.005 MIU/L-ACNC: 2.78 UIU/ML (ref 0.4–4)
TV REST PULMONARY ARTERY PRESSURE: 22 MMHG
WBC # BLD AUTO: 5.06 K/UL (ref 3.9–12.7)

## 2023-04-06 PROCEDURE — 85025 COMPLETE CBC W/AUTO DIFF WBC: CPT | Performed by: STUDENT IN AN ORGANIZED HEALTH CARE EDUCATION/TRAINING PROGRAM

## 2023-04-06 PROCEDURE — 87389 HIV-1 AG W/HIV-1&-2 AB AG IA: CPT | Performed by: STUDENT IN AN ORGANIZED HEALTH CARE EDUCATION/TRAINING PROGRAM

## 2023-04-06 PROCEDURE — 93010 EKG 12-LEAD: ICD-10-PCS | Mod: 76,,, | Performed by: INTERNAL MEDICINE

## 2023-04-06 PROCEDURE — 83735 ASSAY OF MAGNESIUM: CPT | Performed by: STUDENT IN AN ORGANIZED HEALTH CARE EDUCATION/TRAINING PROGRAM

## 2023-04-06 PROCEDURE — 96372 THER/PROPH/DIAG INJ SC/IM: CPT | Performed by: STUDENT IN AN ORGANIZED HEALTH CARE EDUCATION/TRAINING PROGRAM

## 2023-04-06 PROCEDURE — 25000003 PHARM REV CODE 250: Performed by: STUDENT IN AN ORGANIZED HEALTH CARE EDUCATION/TRAINING PROGRAM

## 2023-04-06 PROCEDURE — 93010 ELECTROCARDIOGRAM REPORT: CPT | Mod: 76,,, | Performed by: INTERNAL MEDICINE

## 2023-04-06 PROCEDURE — 93005 ELECTROCARDIOGRAM TRACING: CPT

## 2023-04-06 PROCEDURE — 83036 HEMOGLOBIN GLYCOSYLATED A1C: CPT | Performed by: STUDENT IN AN ORGANIZED HEALTH CARE EDUCATION/TRAINING PROGRAM

## 2023-04-06 PROCEDURE — 84443 ASSAY THYROID STIM HORMONE: CPT | Performed by: STUDENT IN AN ORGANIZED HEALTH CARE EDUCATION/TRAINING PROGRAM

## 2023-04-06 PROCEDURE — 63600175 PHARM REV CODE 636 W HCPCS: Performed by: STUDENT IN AN ORGANIZED HEALTH CARE EDUCATION/TRAINING PROGRAM

## 2023-04-06 PROCEDURE — G0378 HOSPITAL OBSERVATION PER HR: HCPCS

## 2023-04-06 PROCEDURE — 80074 ACUTE HEPATITIS PANEL: CPT | Performed by: STUDENT IN AN ORGANIZED HEALTH CARE EDUCATION/TRAINING PROGRAM

## 2023-04-06 PROCEDURE — 93010 ELECTROCARDIOGRAM REPORT: CPT | Mod: ,,, | Performed by: INTERNAL MEDICINE

## 2023-04-06 PROCEDURE — 80053 COMPREHEN METABOLIC PANEL: CPT | Performed by: STUDENT IN AN ORGANIZED HEALTH CARE EDUCATION/TRAINING PROGRAM

## 2023-04-06 PROCEDURE — 84100 ASSAY OF PHOSPHORUS: CPT | Performed by: STUDENT IN AN ORGANIZED HEALTH CARE EDUCATION/TRAINING PROGRAM

## 2023-04-06 PROCEDURE — 80061 LIPID PANEL: CPT | Performed by: STUDENT IN AN ORGANIZED HEALTH CARE EDUCATION/TRAINING PROGRAM

## 2023-04-06 RX ORDER — ATORVASTATIN CALCIUM 40 MG/1
40 TABLET, FILM COATED ORAL DAILY
Qty: 90 TABLET | Refills: 3 | Status: SHIPPED | OUTPATIENT
Start: 2023-04-06 | End: 2024-04-05

## 2023-04-06 RX ORDER — ATORVASTATIN CALCIUM 40 MG/1
40 TABLET, FILM COATED ORAL DAILY
Status: DISCONTINUED | OUTPATIENT
Start: 2023-04-06 | End: 2023-04-06 | Stop reason: HOSPADM

## 2023-04-06 RX ORDER — PANTOPRAZOLE SODIUM 40 MG/1
40 TABLET, DELAYED RELEASE ORAL DAILY
Qty: 30 TABLET | Refills: 11 | Status: SHIPPED | OUTPATIENT
Start: 2023-04-06 | End: 2024-04-05

## 2023-04-06 RX ORDER — NAPROXEN SODIUM 220 MG/1
81 TABLET, FILM COATED ORAL DAILY
Qty: 90 TABLET | Refills: 3 | Status: SHIPPED | OUTPATIENT
Start: 2023-04-06 | End: 2024-04-05

## 2023-04-06 RX ORDER — ENALAPRIL MALEATE 20 MG/1
20 TABLET ORAL DAILY
COMMUNITY

## 2023-04-06 RX ORDER — AMLODIPINE BESYLATE 5 MG/1
5 TABLET ORAL DAILY
COMMUNITY

## 2023-04-06 RX ORDER — LISINOPRIL 20 MG/1
20 TABLET ORAL DAILY
Status: DISCONTINUED | OUTPATIENT
Start: 2023-04-06 | End: 2023-04-06 | Stop reason: HOSPADM

## 2023-04-06 RX ORDER — NAPROXEN SODIUM 220 MG/1
81 TABLET, FILM COATED ORAL DAILY
Status: DISCONTINUED | OUTPATIENT
Start: 2023-04-06 | End: 2023-04-06 | Stop reason: HOSPADM

## 2023-04-06 RX ORDER — PANTOPRAZOLE SODIUM 40 MG/1
40 TABLET, DELAYED RELEASE ORAL DAILY
Status: DISCONTINUED | OUTPATIENT
Start: 2023-04-06 | End: 2023-04-06 | Stop reason: HOSPADM

## 2023-04-06 RX ORDER — AMLODIPINE BESYLATE 5 MG/1
5 TABLET ORAL DAILY
Status: DISCONTINUED | OUTPATIENT
Start: 2023-04-06 | End: 2023-04-06 | Stop reason: HOSPADM

## 2023-04-06 RX ADMIN — SUCRALFATE 1 G: 1 SUSPENSION ORAL at 12:04

## 2023-04-06 RX ADMIN — ENOXAPARIN SODIUM 40 MG: 40 INJECTION SUBCUTANEOUS at 05:04

## 2023-04-06 RX ADMIN — PANTOPRAZOLE SODIUM 40 MG: 40 TABLET, DELAYED RELEASE ORAL at 10:04

## 2023-04-06 RX ADMIN — MELATONIN TAB 3 MG 9 MG: 3 TAB at 12:04

## 2023-04-06 RX ADMIN — AMLODIPINE BESYLATE 5 MG: 5 TABLET ORAL at 12:04

## 2023-04-06 RX ADMIN — LISINOPRIL 20 MG: 10 TABLET ORAL at 12:04

## 2023-04-06 RX ADMIN — ASPIRIN 81 MG CHEWABLE TABLET 81 MG: 81 TABLET CHEWABLE at 10:04

## 2023-04-06 RX ADMIN — SUCRALFATE 1 G: 1 SUSPENSION ORAL at 05:04

## 2023-04-06 RX ADMIN — ALUMINUM HYDROXIDE, MAGNESIUM HYDROXIDE, AND SIMETHICONE 30 ML: 200; 200; 20 SUSPENSION ORAL at 06:04

## 2023-04-06 RX ADMIN — ALUMINUM HYDROXIDE, MAGNESIUM HYDROXIDE, AND SIMETHICONE 30 ML: 200; 200; 20 SUSPENSION ORAL at 10:04

## 2023-04-06 RX ADMIN — SUCRALFATE 1 G: 1 SUSPENSION ORAL at 10:04

## 2023-04-06 RX ADMIN — ALUMINUM HYDROXIDE, MAGNESIUM HYDROXIDE, AND SIMETHICONE 30 ML: 200; 200; 20 SUSPENSION ORAL at 05:04

## 2023-04-06 RX ADMIN — ATORVASTATIN CALCIUM 40 MG: 40 TABLET, FILM COATED ORAL at 10:04

## 2023-04-06 NOTE — ED TRIAGE NOTES
The patient complains of left sided chest pain x 1 day. She reports the patient is intermittent and sharp and characteristic. The pain does not radiate. Hx of HTN and GERD.

## 2023-04-06 NOTE — ED NOTES
The patient ambulated to and from the bathroom with a steady gait. No changes in patient's condition. Remains stable. Will continue to monitor.

## 2023-04-06 NOTE — H&P
[facility] Medicine H&P Note     Admitting Team: [facility] Hospitalist Team   Attending Physician: Silvino Angeles MD  Resident: Jose  Intern: Ghulam    Date of Admit: 4/5/2023    Chief Complaint     Chest Pain (Pt c/o chest pain that started yesterday in the morning. Pt denies nausea or vomiting. The pain is intermittent and she describes as sharp and stabbing. )   Duration:  1 day    Subjective:      History of Present Illness:  Rena Asencio is a 57 y.o. White female who has a PMH of HTN who presented to [facility] on 4/5/2023 with a primary complaint of chest pain for 1 day     Patient was in her usual state of health until the morning of presentation when she awoke from sleep with what she describes as 10/10, sharp, stabbing, substernal, nonradiating chest pain.  She reports that since onset of her chest pain it has waxed and waned but has never gone away.  She reports that the pain is not worse with exertion but does get somewhat better with rest.  She endorses some numbness in her entire right arm during the episodes of chest pain but otherwise denies any diaphoresis, SOB.  She can not identify any exacerbating or relieving factors.  Reports that she took her blood pressure which she noted was very high in the 180s systolic.  She reports that she experienced this pain similarly in 2019 when she presented to [facility] at which point she was diagnosed with GERD and discharged home.  States that she has a father who passed away from a heart attack and a mother who has HTN.  On presentation to the ED, she was afebrile, hypertensive 166/83.  CBC, CMP unremarkable.  Troponin negative x2.  Was given a GI cocktail in the ED with improvement in her symptoms.    Past Medical History:  HTN    Past Surgical History:  No past surgical history on file.    Allergies:  Review of patient's allergies indicates:  No Known Allergies    Home Medications:  Enalapril 20 mg daily   Amlodipine unknown dose    Family  History:  No family history on file.  Father:  at age 60s of MI  Mother: alive - Has the following health problems HTN    Social History:  Alcohol: no  Tobacco: no  Illicits: no  Living situation:  Lives with daughter  ADLs:  Able to perform ADLs independently     Review of Systems:  Pertinent items are noted in HPI. All other systems are reviewed and are negative.    Health Maintaince :   Primary Care Physician: Primary Doctor No    Immunizations:     There is no immunization history on file for this patient.     Objective:   Last 24 Hour Vital Signs:  BP  Min: 122/67  Max: 166/83  Temp  Av.1 °F (36.7 °C)  Min: 98.1 °F (36.7 °C)  Max: 98.1 °F (36.7 °C)  Pulse  Av.4  Min: 67  Max: 90  Resp  Av.6  Min: 10  Max: 20  SpO2  Av.1 %  Min: 95 %  Max: 100 %  Weight  Av.4 kg (175 lb)  Min: 79.4 kg (175 lb)  Max: 79.4 kg (175 lb)  Body mass index is 29.12 kg/m².  No intake/output data recorded.    Physical Examination:  Physical Exam  Constitutional:       General: She is not in acute distress.     Appearance: Normal appearance. She is not ill-appearing, toxic-appearing or diaphoretic.   HENT:      Head: Normocephalic and atraumatic.      Nose: No congestion or rhinorrhea.      Mouth/Throat:      Mouth: Mucous membranes are moist.      Pharynx: Oropharynx is clear. No oropharyngeal exudate or posterior oropharyngeal erythema.   Eyes:      General: No scleral icterus.     Extraocular Movements: Extraocular movements intact.      Pupils: Pupils are equal, round, and reactive to light.   Cardiovascular:      Rate and Rhythm: Normal rate and regular rhythm.      Pulses: Normal pulses.      Heart sounds: Normal heart sounds. No murmur heard.    No friction rub. No gallop.   Pulmonary:      Effort: Pulmonary effort is normal. No respiratory distress.      Breath sounds: Normal breath sounds. No stridor. No wheezing, rhonchi or rales.   Chest:      Chest wall: No tenderness.   Abdominal:       General: Abdomen is flat. Bowel sounds are normal. There is no distension.      Palpations: Abdomen is soft. There is no mass.      Tenderness: There is no abdominal tenderness. There is no guarding or rebound.      Hernia: No hernia is present.   Musculoskeletal:         General: No swelling or tenderness. Normal range of motion.      Cervical back: Normal range of motion. No rigidity or tenderness.      Right lower leg: No edema.      Left lower leg: No edema.   Skin:     General: Skin is warm and dry.      Coloration: Skin is not jaundiced or pale.      Findings: No bruising, erythema, lesion or rash.   Neurological:      General: No focal deficit present.      Mental Status: She is alert and oriented to person, place, and time. Mental status is at baseline.      Cranial Nerves: No cranial nerve deficit.      Sensory: No sensory deficit.      Motor: No weakness.   Psychiatric:         Mood and Affect: Mood normal.         Behavior: Behavior normal.         Thought Content: Thought content normal.         Judgment: Judgment normal.       Laboratory:  Recent Labs   Lab 04/05/23 1916   WBC 4.66   HGB 13.1   HCT 37.2      MCV 85      K 3.7      CO2 24   BUN 18   CREATININE 0.8   *   PROT 7.4   ALBUMIN 3.9   BILITOT 0.2   AST 20   ALKPHOS 117   ALT 22     Cardiac Data:  Recent Labs   Lab 04/05/23 1916 04/05/23  2153   TROPONINI <0.006 0.007   BNP 21  --      Microbiology:  Microbiology Results (last 7 days)       Procedure Component Value Units Date/Time    Influenza A & B by Molecular [[id number]] Collected: 04/06/23 0050    Order Status: Sent Specimen: Nasopharyngeal Swab Updated: 04/06/23 0050           No results for input(s): COLORU, CLARITYU, SPECGRAV, PHUR, PROTEINUA, GLUCOSEU, BILIRUBINCON, BLOODU, WBCU, RBCU, BACTERIA, MUCUS, NITRITE, LEUKOCYTESUR, UROBILINOGEN, HYALINECASTS in the last 24 hours.    Other Results:  Radiology:  X-Ray Chest AP Portable   Final Result      No acute  abnormality.         Electronically signed by: Ilir Ang   Date:    04/05/2023   Time:    21:03           Assessment/Plan:     Rena Asencio is a 57 y.o. White female who has a PMH of HTN who was admitted to [facility] for chest pain    Chest pain  - Patient initially presented with a 1 day h/o sudden onset, sharp, substernal, nonradiating chest pain that is not worse with exertion or associated with SOB or diaphoresis  - CBC, CMP, lipid panel, hemoglobin A1c, TSH unremarkable  - BNP WNL at 21   - Troponin negative x2  - EKG with normal sinus rhythm, some mild T-wave flattening in III; otherwise, no signs of ischemia or infarction  - TTE ordered and pending  - Monitor on telemetry    HTN   - Home medications:  Enalapril 20 mg, amlodipine at unknown dose   - Started patient on lisinopril 20 mg as enalapril is not on formulary in addition to amlodipine 5 mg    Diet: Regular  DVT PPX: Lovenox, SCDs while in bed     Dispo: Pending clinical improvement and resolution of symptoms  Code: Full Code    Reggie Duarte MD  [facility] -III    [facility] Medicine Hospitalist Pager numbers:   [facility] Hospitalist Medicine Team A (Bridgette/Yuliana): [phone]  [facility] Hospitalist Medicine Team B (Clemente/Poncho):  [phone]

## 2023-04-06 NOTE — ED NOTES
Adult Physical Assessment  LOC: Rena Asencio, 57 y.o. female verified via two identifiers.  The patient is awake, alert, oriented and speaking appropriately at this time.  APPEARANCE: Patient resting comfortably and appears to be in no acute distress at this time. Patient is clean and well groomed, patient's clothing is properly fastened.  SKIN:The skin is warm and dry, color consistent with ethnicity, patient has normal skin turgor and moist mucus membranes, skin intact, no breakdown or brusing noted.  MUSCULOSKELETAL: Patient moving all extremities well, no obvious swelling or deformities noted.  RESPIRATORY: Airway is open and patent, respirations are spontaneous, patient has a normal effort and rate, no accessory muscle use noted.  CARDIAC: Patient has a normal rate and rhythm, no periphreal edema noted in any extremity, capillary refill < 3 seconds in all extremities. Intermittent, sharp  left-sided chest pain x 1 day.   ABDOMEN: Soft and non tender to palpation, no abdominal distention noted. Bowel sounds present in all four quadrants. Denies nausea and vomiting. Denies abdominal pain.  NEUROLOGIC: Eyes open spontaneously, behavior appropriate to situation, follows commands, facial expression symmetrical, bilateral hand grasp equal and even, purposeful motor response noted, normal sensation in all extremities when touched with a finger.

## 2023-04-06 NOTE — DISCHARGE SUMMARY
[facility] Medicine Discharge Summary    Primary Team: [facility] Hospitalist Team A  Attending Physician: Silvino Angeles MD  Resident: Yakelin Garcia MD  Intern: Nicholas Parmar MD    Date of Admit: 4/5/2023  Date of Discharge: 4/6/2023    Discharge to: Home  Condition: Stable    Discharge Diagnoses     Patient Active Problem List   Diagnosis    Hypertension    Chest pain    Depression       Consultants and Procedures     Consultants:  N/A    Procedures:   N/A    Imaging:  X-Ray Chest AP Portable   Final Result      No acute abnormality.         Electronically signed by: Ilir Ang   Date:    04/05/2023   Time:    21:03           Brief History of Present Illness      Rena Asencio is a 57 y.o. White female who has a PMH of HTN who presented to [facility] on 4/5/2023 with a primary complaint of chest pain for 1 day      Patient was in her usual state of health until the morning of presentation when she awoke from sleep with what she describes as 10/10, sharp, stabbing, substernal, nonradiating chest pain.  She reports that since onset of her chest pain it has waxed and waned but has never gone away.  She reports that the pain is not worse with exertion but does get somewhat better with rest.  She endorses some numbness in her entire right arm during the episodes of chest pain but otherwise denies any diaphoresis, SOB.  She can not identify any exacerbating or relieving factors.  Reports that she took her blood pressure which she noted was very high in the 180s systolic.  She reports that she experienced this pain similarly in 2019 when she presented to [facility] at which point she was diagnosed with GERD and discharged home.  States that she has a father who passed away from a heart attack and a mother who has HTN.  On presentation to the ED, she was afebrile, hypertensive 166/83.  CBC, CMP unremarkable.  Troponin negative x2.  Was given a GI cocktail in the ED with improvement in her symptoms.    For  the full HPI please refer to the History & Physical from this admission.    Hospital Course By Problem with Pertinent Findings       Chest pain  Etiology likely GERD. ACS was ruled out.  - Patient initially presented with a 1 day h/o sudden onset, sharp, substernal, nonradiating chest pain that is not worse with exertion or associated with SOB or diaphoresis. CBC, CMP, lipid panel, hemoglobin A1c, TSH unremarkable. BNP WNL at 21   - Troponin negative x2. EKG with normal sinus rhythm, some mild T-wave flattening in III; otherwise, no signs of ischemia or infarction. No events on telemetry.   - TTE obtained and without abnormalities.       HTN   - Home medications:  Enalapril 20 mg, amlodipine 5mg daily.   - Started patient on lisinopril 20 mg as enalapril is not on formulary in addition to amlodipine 5 mg. Discharge on home meds.       Discharge Medications        Medication List        START taking these medications      aspirin 81 MG Chew  Take 1 tablet (81 mg total) by mouth once daily.     atorvastatin 40 MG tablet  Commonly known as: LIPITOR  Take 1 tablet (40 mg total) by mouth once daily.     pantoprazole 40 MG tablet  Commonly known as: PROTONIX  Take 1 tablet (40 mg total) by mouth once daily.            CONTINUE taking these medications      amLODIPine 5 MG tablet  Commonly known as: NORVASC     enalapril 20 MG tablet  Commonly known as: VASOTEC            STOP taking these medications      lisinopriL 10 MG tablet               Where to Get Your Medications        These medications were sent to [facility] [place] - [address]  [address] [place] [address]      Phone: [phone]   aspirin 81 MG Chew  atorvastatin 40 MG tablet  pantoprazole 40 MG tablet         Discharge Information:     Diet:  Regular    Physical Activity:  As tolerated             Instructions:  1. Take all medications as prescribed  2. Keep all follow-up appointments  3. Return to the hospital or call  your primary care physicians if any worsening symptoms such as fever, chest pain, shortness of breath, return of symptoms, or any other concerns.    Follow-Up Appointments:  - Follow-up PCP    Yakelin Garcia MD  [facility] Internal Medicine, [facility]

## 2023-04-06 NOTE — PLAN OF CARE
Discharge orders noted. No DME or Home Health ordered. PCP offices closed. Patient will need to contact office to schedule. No further Case Management needs.     Patient Contacts    Name Relation Home Work Mobile   Cecilia Lane Daughter   [phone]     No, Primary Doctor No, Primary Doctor PCP - General          Next Steps: Follow up in 2 week(s)        04/06/23 0585   Final Note   Assessment Type Final Discharge Note   Anticipated Discharge Disposition Home   Post-Acute Status   Discharge Delays None known at this time     Lexi Randle RN    [phone]

## 2023-04-06 NOTE — PLAN OF CARE
04/06/23 3447   AVS Confirmation   Discharge instructions and AVS given to and reviewed with patient and/or significant other. Yes       AVS printed and handed to patient by bedside nurse. VN reviewed discharge instructions with patient using teachback method via  Trent (#[id number]).  Allowed time for questions, all questions answered.  Patient verbalized complete understanding of discharge instructions and voices no concerns. Discharge instructions complete. Transport wheelchair requested. Bedside nurse notified.

## 2023-04-06 NOTE — ED PROVIDER NOTES
Emergency Department Encounter  Provider Note  Encounter Date: 4/5/2023    Patient Name: Rena Asencio  MRN: [id number]    History of Present Illness   HPI  History of Present Illness:    Chief Complaint:   Chief Complaint   Patient presents with    Chest Pain     Pt c/o chest pain that started yesterday in the morning. Pt denies nausea or vomiting. The pain is intermittent and she describes as sharp and stabbing.        57-year-old female presenting with 2 days chest pain that started when she was resting.  Patient is a very poor historian, even with a .  Reticent with history.  Denies any fevers or chills, nausea or vomiting.  Denies any trauma.  Took an aspirin yesterday and today.  Has had symptoms like this before and went to the doctor but they told her nothing.   Does not have a cardiologist.  Denies any leg pain.    The following PMH/PSH/SocHx/FamHx has been reviewed by myself:    Past Medical History:   Diagnosis Date    Hypertension      No past surgical history on file.  Social History     Tobacco Use    Smoking status: Never   Substance Use Topics    Alcohol use: No     No family history on file.    Allergies reviewed:  Review of patient's allergies indicates:  No Known Allergies    Medications reviewed:  Medication List with Changes/Refills   Current Medications    CYCLOBENZAPRINE (FLEXERIL) 10 MG TABLET    Take 1 tablet (10 mg total) by mouth 3 (three) times daily as needed for Muscle spasms (Or tightness). Do not drive or operate heavy machinery on this medication as it can cause you to become drowsy and decrease coordination.    FAMOTIDINE (PEPCID) 20 MG TABLET    Take 1 tablet (20 mg total) by mouth 2 (two) times daily.    LISINOPRIL 10 MG TABLET    Take 1 tablet (10 mg total) by mouth once daily.    NAPROXEN (NAPROSYN) 500 MG TABLET    Take 1 tablet (500 mg total) by mouth 2 (two) times daily as needed (Pain). Take with food to prevent upset stomach       ROS  Review of  Systems:    Constitutional:  Negative for fever.   HENT:  Negative for sore throat.    Eyes:  Negative for redness.   Respiratory:  Negative for shortness of breath.    Cardiovascular:  Positive for chest pain.   Gastrointestinal:  Negative for nausea.   Genitourinary:  Negative for dysuria.   Musculoskeletal:  Negative for back pain.   Skin:  Negative for rash.   Neurological:  Negative for weakness.   Hematological:  Does not bruise/bleed easily.   Psychiatric/Behavioral:  The patient is not nervous/anxious.      Physical Exam   Physical Exam    Initial Vitals [04/05/23 1836]   BP Pulse Resp Temp SpO2   (!) 166/83 90 18 98.1 °F (36.7 °C) 98 %      MAP       --           Triage vital signs reviewed.    Constitutional: Well-nourished, well-developed. Not in acute distress.  HENT: Normocephalic, atraumatic. Moist mucous membranes.  Eyes: No conjunctival injection.  Resp: Normal respiratory effort, breathing unlabored.  Cardio: Regular rate and rhythm.  GI: Abdomen non-distended.   MSK: Extremities without any deformities noted. No lower extremity edema.  Skin: Warm and dry. No rashes or lesions noted.  Neuro: Awake and alert. Moves all extremities.    ED Course   Procedures    Medical Decision Making    History Acquisition     The history is provided by the patient.    utilized for encounter.    Review of prior external/non ED notes:  Internal medicine note for H pylori infection 2019    Differential Diagnoses   Based on available information and initial assessment, the working differential diagnoses considered during this evaluation include but are not limited to   ACS/MI, PE, aortic dissection, pneumothorax, cardiac tamponade, pericarditis/myocarditis, pneumonia, infection/abscess, lung mass, trauma/fracture, costochondritis/pleurisy, MSK pain/contusion, GERD, biliary disease, pancreatitis, anemia  .    EKG   EKG ordered and independently reviewed by me:   Normal sinus rhythm, rate 88, normal  intervals, normal axis, no STEMI    Labs   Lab tests ordered and independently reviewed by me:    Labs Reviewed   CBC W/ AUTO DIFFERENTIAL - Abnormal; Notable for the following components:       Result Value    MPV 8.9 (*)     All other components within normal limits   COMPREHENSIVE METABOLIC PANEL - Abnormal; Notable for the following components:    Glucose 115 (*)     All other components within normal limits   INFLUENZA A & B BY MOLECULAR   TROPONIN I   TROPONIN I   B-TYPE NATRIURETIC PEPTIDE   SARS-COV-2 RNA AMPLIFICATION, QUAL   HEMOGLOBIN A1C   HEPATITIS PANEL, ACUTE   TSH   LIPID PANEL   HIV 1 / 2 ANTIBODY         Imaging   Imaging ordered and independently reviewed by me:   Imaging Results              X-Ray Chest AP Portable (Final result)  Result time 04/05/23 21:03:13      Final result by Ilir Ang DO (04/05/23 21:03:13)                   Impression:      No acute abnormality.      Electronically signed by: Ilir Ang  Date:    04/05/2023  Time:    21:03               Narrative:    EXAMINATION:  XR CHEST AP PORTABLE    CLINICAL HISTORY:  Chest pain, unspecified    TECHNIQUE:  Single frontal view of the chest was performed.    COMPARISON:  12/18/2022.    FINDINGS:  The lungs are well expanded and clear. No focal opacities are seen. The pleural spaces are clear.    The cardiac silhouette is unremarkable.    The visualized osseous structures demonstrate degenerative changes.                                           Additional Consideration   Rena Asencio  presents to the emergency Department today with chest pain that started yesterday.  Poor historian.  Patient texting while being interviewed using the .  Given age and history of hypertension, will do a cardiac workup.  Patient's heart score is 4.  EKG is concerning for changes compared to 2022, with possible impending biphasic T-waves in V2.  Resolution of the incomplete right bundle branch.  Given that the patient does  not speak English, does not have a reliable PCP, does not have a cardiologist, will admit the patient for possible stress test.  Sign-out given to [facility] internal Medicine.    Additional testing considered but not indicated during the course of this workup: further imaging and labwork, not indicated  Co-morbidities/chronic illness/exacerbation of chronic illness considered which impacted clinical decision making:  Hypertension  Procedures done in the ED or plan for the OR: No  Social determinants of care considered during development of treatment plan include: Decreased medical literacy, Decreased literacy, Access to healthcare, and Language barrier  Discussion of management or test interpretation with external provider: No  DNR discussion: No    The patient's list of active medications and allergies as documented per RN staff has been reviewed.  Medications given in the ED and/or prescribed:   Medications   melatonin tablet 9 mg (has no administration in time range)   sodium chloride 0.9% flush 10 mL (has no administration in time range)   dextrose 40 % gel 15,000 mg (has no administration in time range)   dextrose 40 % gel 30,000 mg (has no administration in time range)   dextrose 10% bolus 125 mL 125 mL (has no administration in time range)   dextrose 10% bolus 250 mL 250 mL (has no administration in time range)   glucagon (human recombinant) injection 1 mg (has no administration in time range)   naloxone 0.4 mg/mL injection 0.02 mg (has no administration in time range)   sucralfate 100 mg/mL suspension 1 g (has no administration in time range)   aluminum-magnesium hydroxide-simethicone 200-200-20 mg/5 mL suspension 30 mL (has no administration in time range)   enoxaparin injection 40 mg (has no administration in time range)   aluminum-magnesium hydroxide-simethicone 200-200-20 mg/5 mL suspension 30 mL (30 mLs Oral Given 4/5/23 2108)     And   LIDOcaine HCl 2% oral solution 15 mL (15 mLs Oral Given 4/5/23 2108)              ED Course as of 04/06/23 0028 Wed Apr 05, 2023 2242 CBC auto differential(!)  Independently interpreted by me, unremarkable    [CS]   2242 Comprehensive metabolic panel(!)  Independently interpreted by me, unremarkable    [CS]   2242 BNP  .   [CS]   2242 Troponin I: 0.007 [CS]   2243 Troponin I #2 [CS]   2243 X-Ray Chest AP Portable  Independently interpreted by me, unremarkable    [CS]      ED Course User Index  [CS] Chayito Avilez MD       Explanation of disposition: admission    Clinical Impression:     1. Chest pain       ED Disposition Condition    Observation Stable               Chayito Avilez MD  04/06/23 0028

## 2023-04-06 NOTE — PROGRESS NOTES
[facility]                    Pharmacy       Discharge Medication Education    Patient ACCEPTED medication education. Pharmacy has provided education on the name, indication, and possible side effects of the medication(s) prescribed, using teach-back method.     The following medications have also been discussed, during this admission.        Medication List        START taking these medications      aspirin 81 MG Chew  Take 1 tablet (81 mg total) by mouth once daily.     atorvastatin 40 MG tablet  Commonly known as: LIPITOR  Take 1 tablet (40 mg total) by mouth once daily.     pantoprazole 40 MG tablet  Commonly known as: PROTONIX  Take 1 tablet (40 mg total) by mouth once daily.            CONTINUE taking these medications      amLODIPine 5 MG tablet  Commonly known as: NORVASC     enalapril 20 MG tablet  Commonly known as: VASOTEC            STOP taking these medications      lisinopriL 10 MG tablet               Where to Get Your Medications        [facility] [address] [place], [place] [address] [address]  [address] [address]      Phone: [phone]   aspirin 81 MG Chew  atorvastatin 40 MG tablet  pantoprazole 40 MG tablet          Thank you  Renato Vieira, PharmD  [phone]